# Patient Record
Sex: MALE | URBAN - METROPOLITAN AREA
[De-identification: names, ages, dates, MRNs, and addresses within clinical notes are randomized per-mention and may not be internally consistent; named-entity substitution may affect disease eponyms.]

---

## 2018-10-19 ENCOUNTER — APPOINTMENT (RX ONLY)
Dept: URBAN - METROPOLITAN AREA CLINIC 26 | Facility: CLINIC | Age: 63
Setting detail: DERMATOLOGY
End: 2018-10-19

## 2018-10-19 DIAGNOSIS — L01.01 NON-BULLOUS IMPETIGO: ICD-10-CM

## 2018-10-19 DIAGNOSIS — L30.0 NUMMULAR DERMATITIS: ICD-10-CM

## 2018-10-19 PROBLEM — J30.1 ALLERGIC RHINITIS DUE TO POLLEN: Status: ACTIVE | Noted: 2018-10-19

## 2018-10-19 PROBLEM — L70.0 ACNE VULGARIS: Status: ACTIVE | Noted: 2018-10-19

## 2018-10-19 PROBLEM — G40.909 EPILEPSY, UNSPECIFIED, NOT INTRACTABLE, WITHOUT STATUS EPILEPTICUS: Status: ACTIVE | Noted: 2018-10-19

## 2018-10-19 PROCEDURE — ? COUNSELING

## 2018-10-19 PROCEDURE — ? PRESCRIPTION

## 2018-10-19 PROCEDURE — ? TREATMENT REGIMEN

## 2018-10-19 PROCEDURE — 99213 OFFICE O/P EST LOW 20 MIN: CPT

## 2018-10-19 RX ORDER — MUPIROCIN 20 MG/G
1 OINTMENT TOPICAL BID
Qty: 1 | Refills: 1 | Status: ERX | COMMUNITY
Start: 2018-10-19

## 2018-10-19 RX ORDER — TRIAMCINOLONE ACETONIDE 1 MG/G
1 OINTMENT TOPICAL BID
Qty: 1 | Refills: 0 | Status: ERX | COMMUNITY
Start: 2018-10-19

## 2018-10-19 RX ADMIN — MUPIROCIN 1: 20 OINTMENT TOPICAL at 13:20

## 2018-10-19 RX ADMIN — TRIAMCINOLONE ACETONIDE 1: 1 OINTMENT TOPICAL at 13:23

## 2018-10-19 ASSESSMENT — LOCATION DETAILED DESCRIPTION DERM
LOCATION DETAILED: LEFT POPLITEAL SKIN
LOCATION DETAILED: LEFT DISTAL PRETIBIAL REGION
LOCATION DETAILED: LEFT PROXIMAL CALF
LOCATION DETAILED: RIGHT LATERAL DISTAL PRETIBIAL REGION

## 2018-10-19 ASSESSMENT — LOCATION SIMPLE DESCRIPTION DERM
LOCATION SIMPLE: LEFT CALF
LOCATION SIMPLE: RIGHT PRETIBIAL REGION
LOCATION SIMPLE: LEFT POPLITEAL SKIN
LOCATION SIMPLE: LEFT PRETIBIAL REGION

## 2018-10-19 ASSESSMENT — LOCATION ZONE DERM: LOCATION ZONE: LEG

## 2018-10-19 NOTE — PROCEDURE: TREATMENT REGIMEN
Detail Level: Simple
Initiate Treatment: warm water soaks x 15 minutes bid then apply mupirocin ointment bid
Otc Regimen: hibiclens cleanser qday
Otc Regimen: gsc regimen as reviewed
Plan: rto 2 months or sooner prn
Initiate Treatment: TAC 0.1% ointment then vaseline bid until itch free and smooth, then d/c TAC and continue with GSC regimen
Samples Given: dove cleanser, cerave cleanser, cerave cream, aveeno eczema cream

## 2018-11-30 ENCOUNTER — APPOINTMENT (RX ONLY)
Dept: URBAN - METROPOLITAN AREA CLINIC 23 | Facility: CLINIC | Age: 63
Setting detail: DERMATOLOGY
End: 2018-11-30

## 2018-11-30 DIAGNOSIS — L81.0 POSTINFLAMMATORY HYPERPIGMENTATION: ICD-10-CM

## 2018-11-30 DIAGNOSIS — L30.0 NUMMULAR DERMATITIS: ICD-10-CM | Status: IMPROVED

## 2018-11-30 PROCEDURE — 99213 OFFICE O/P EST LOW 20 MIN: CPT

## 2018-11-30 PROCEDURE — ? TREATMENT REGIMEN

## 2018-11-30 PROCEDURE — ? COUNSELING

## 2018-11-30 ASSESSMENT — LOCATION SIMPLE DESCRIPTION DERM
LOCATION SIMPLE: RIGHT POPLITEAL SKIN
LOCATION SIMPLE: LEFT PRETIBIAL REGION
LOCATION SIMPLE: RIGHT UPPER ARM
LOCATION SIMPLE: RIGHT LOWER LEG
LOCATION SIMPLE: RIGHT BUTTOCK
LOCATION SIMPLE: RIGHT THIGH
LOCATION SIMPLE: LEFT UPPER ARM
LOCATION SIMPLE: LEFT POPLITEAL SKIN

## 2018-11-30 ASSESSMENT — LOCATION ZONE DERM
LOCATION ZONE: ARM
LOCATION ZONE: TRUNK
LOCATION ZONE: LEG

## 2018-11-30 ASSESSMENT — LOCATION DETAILED DESCRIPTION DERM
LOCATION DETAILED: RIGHT POPLITEAL SKIN
LOCATION DETAILED: RIGHT DISTAL POSTERIOR UPPER ARM
LOCATION DETAILED: RIGHT LATERAL POPLITEAL FOSSA
LOCATION DETAILED: RIGHT POSTERIOR LATERAL DISTAL THIGH
LOCATION DETAILED: LEFT POPLITEAL SKIN
LOCATION DETAILED: RIGHT BUTTOCK
LOCATION DETAILED: LEFT MEDIAL PROXIMAL PRETIBIAL REGION
LOCATION DETAILED: LEFT PROXIMAL POSTERIOR UPPER ARM

## 2018-11-30 NOTE — PROCEDURE: TREATMENT REGIMEN
Continue Regimen: Triamcinolone 0.1% ointment BID x 2 weeks for flares.
Otc Regimen: Use eucerin eczema cream or Vaseline to moisturize the body \\nKeep showers short and kim
Detail Level: Zone

## 2019-01-11 ENCOUNTER — APPOINTMENT (RX ONLY)
Dept: URBAN - METROPOLITAN AREA CLINIC 31 | Facility: CLINIC | Age: 64
Setting detail: DERMATOLOGY
End: 2019-01-11

## 2019-01-11 DIAGNOSIS — L30.0 NUMMULAR DERMATITIS: ICD-10-CM | Status: UNCHANGED

## 2019-01-11 DIAGNOSIS — L81.0 POSTINFLAMMATORY HYPERPIGMENTATION: ICD-10-CM | Status: IMPROVED

## 2019-01-11 PROCEDURE — 99213 OFFICE O/P EST LOW 20 MIN: CPT

## 2019-01-11 PROCEDURE — ? COUNSELING

## 2019-01-11 PROCEDURE — ? TREATMENT REGIMEN

## 2019-01-11 ASSESSMENT — LOCATION DETAILED DESCRIPTION DERM
LOCATION DETAILED: LEFT PROXIMAL PRETIBIAL REGION
LOCATION DETAILED: LEFT ANTERIOR PROXIMAL THIGH

## 2019-01-11 ASSESSMENT — LOCATION SIMPLE DESCRIPTION DERM
LOCATION SIMPLE: LEFT PRETIBIAL REGION
LOCATION SIMPLE: LEFT THIGH

## 2019-01-11 ASSESSMENT — LOCATION ZONE DERM: LOCATION ZONE: LEG

## 2019-01-11 NOTE — PROCEDURE: TREATMENT REGIMEN
Otc Regimen: Use eucerin eczema cream or Vaseline to moisturize the body \\nKeep showers short and kim
Detail Level: Zone
Continue Regimen: Triamcinolone 0.1% ointment BID x 2 weeks.
Plan: Discussed pt should be consistent with medication instructions so that we can ensure the rash is responding well to the treatment. If no change at follow up will biopsy

## 2019-02-01 ENCOUNTER — APPOINTMENT (RX ONLY)
Dept: URBAN - METROPOLITAN AREA CLINIC 23 | Facility: CLINIC | Age: 64
Setting detail: DERMATOLOGY
End: 2019-02-01

## 2019-02-01 DIAGNOSIS — L01.01 NON-BULLOUS IMPETIGO: ICD-10-CM | Status: RESOLVED

## 2019-02-01 DIAGNOSIS — L30.0 NUMMULAR DERMATITIS: ICD-10-CM | Status: RESOLVED

## 2019-02-01 PROCEDURE — 99213 OFFICE O/P EST LOW 20 MIN: CPT

## 2019-02-01 PROCEDURE — ? COUNSELING

## 2019-02-01 PROCEDURE — ? TREATMENT REGIMEN

## 2019-02-01 ASSESSMENT — LOCATION DETAILED DESCRIPTION DERM: LOCATION DETAILED: LEFT ANTERIOR PROXIMAL THIGH

## 2019-02-01 ASSESSMENT — LOCATION ZONE DERM: LOCATION ZONE: LEG

## 2019-02-01 ASSESSMENT — LOCATION SIMPLE DESCRIPTION DERM: LOCATION SIMPLE: LEFT THIGH

## 2019-02-01 NOTE — PROCEDURE: TREATMENT REGIMEN
Discontinue Regimen: Triamcinolone ointment
Otc Regimen: Use eucerin eczema cream or Vaseline to moisturize the body \\nKeep showers short and kim
Detail Level: Zone

## 2020-01-21 ENCOUNTER — APPOINTMENT (RX ONLY)
Dept: URBAN - METROPOLITAN AREA CLINIC 23 | Facility: CLINIC | Age: 65
Setting detail: DERMATOLOGY
End: 2020-01-21

## 2020-01-21 DIAGNOSIS — D22 MELANOCYTIC NEVI: ICD-10-CM

## 2020-01-21 DIAGNOSIS — D18.0 HEMANGIOMA: ICD-10-CM

## 2020-01-21 DIAGNOSIS — L82.1 OTHER SEBORRHEIC KERATOSIS: ICD-10-CM

## 2020-01-21 DIAGNOSIS — B35.4 TINEA CORPORIS: ICD-10-CM

## 2020-01-21 PROBLEM — D18.01 HEMANGIOMA OF SKIN AND SUBCUTANEOUS TISSUE: Status: ACTIVE | Noted: 2020-01-21

## 2020-01-21 PROBLEM — D22.5 MELANOCYTIC NEVI OF TRUNK: Status: ACTIVE | Noted: 2020-01-21

## 2020-01-21 PROCEDURE — ? COUNSELING

## 2020-01-21 PROCEDURE — 99213 OFFICE O/P EST LOW 20 MIN: CPT

## 2020-01-21 PROCEDURE — ? PRESCRIPTION

## 2020-01-21 RX ORDER — KETOCONAZOLE 20 MG/G
CREAM TOPICAL
Qty: 1 | Refills: 2 | Status: ERX | COMMUNITY
Start: 2020-01-21

## 2020-01-21 RX ADMIN — KETOCONAZOLE: 20 CREAM TOPICAL at 00:00

## 2020-01-21 ASSESSMENT — LOCATION DETAILED DESCRIPTION DERM
LOCATION DETAILED: PERIUMBILICAL SKIN
LOCATION DETAILED: LEFT INFERIOR MEDIAL UPPER BACK
LOCATION DETAILED: INFERIOR THORACIC SPINE
LOCATION DETAILED: LEFT ANTERIOR DISTAL THIGH

## 2020-01-21 ASSESSMENT — LOCATION ZONE DERM
LOCATION ZONE: TRUNK
LOCATION ZONE: LEG

## 2020-01-21 ASSESSMENT — LOCATION SIMPLE DESCRIPTION DERM
LOCATION SIMPLE: UPPER BACK
LOCATION SIMPLE: LEFT THIGH
LOCATION SIMPLE: LEFT UPPER BACK
LOCATION SIMPLE: ABDOMEN

## 2020-01-21 NOTE — PROCEDURE: MIPS QUALITY
Quality 226: Preventive Care And Screening: Tobacco Use: Screening And Cessation Intervention: Patient screened for tobacco use and is an ex/non-smoker
Detail Level: Detailed
Quality 47: Advance Care Plan: Advance care planning not documented, reason not otherwise specified.
Quality 130: Documentation Of Current Medications In The Medical Record: Current Medications Documented
Quality 431: Preventive Care And Screening: Unhealthy Alcohol Use - Screening: Patient screened for unhealthy alcohol use using a single question and scores less than 2 times per year

## 2020-02-04 ENCOUNTER — APPOINTMENT (RX ONLY)
Dept: URBAN - METROPOLITAN AREA CLINIC 23 | Facility: CLINIC | Age: 65
Setting detail: DERMATOLOGY
End: 2020-02-04

## 2020-02-04 DIAGNOSIS — L30.0 NUMMULAR DERMATITIS: ICD-10-CM

## 2020-02-04 PROCEDURE — ? COUNSELING

## 2020-02-04 PROCEDURE — 99213 OFFICE O/P EST LOW 20 MIN: CPT

## 2020-02-04 PROCEDURE — ? TREATMENT REGIMEN

## 2020-02-04 ASSESSMENT — LOCATION ZONE DERM: LOCATION ZONE: LEG

## 2020-02-04 ASSESSMENT — LOCATION DETAILED DESCRIPTION DERM: LOCATION DETAILED: LEFT ANTERIOR DISTAL THIGH

## 2020-02-04 ASSESSMENT — LOCATION SIMPLE DESCRIPTION DERM: LOCATION SIMPLE: LEFT THIGH

## 2020-02-04 NOTE — PROCEDURE: MIPS QUALITY
Detail Level: Detailed
Quality 130: Documentation Of Current Medications In The Medical Record: Current Medications Documented
Quality 47: Advance Care Plan: Advance care planning not documented, reason not otherwise specified.
Quality 431: Preventive Care And Screening: Unhealthy Alcohol Use - Screening: Patient screened for unhealthy alcohol use using a single question and scores less than 2 times per year
Quality 226: Preventive Care And Screening: Tobacco Use: Screening And Cessation Intervention: Patient screened for tobacco use and is an ex/non-smoker

## 2020-02-04 NOTE — PROCEDURE: TREATMENT REGIMEN
Detail Level: Simple
Plan: Return to office if rash does not clear within 2 weeks.
Discontinue Regimen: Ketoconazole 2% cream
Initiate Treatment: Triamcinolone 0.1% ointment: Apply to affected area of leg twice daily x 2 weeks.

## 2020-12-30 ENCOUNTER — APPOINTMENT (RX ONLY)
Dept: URBAN - METROPOLITAN AREA CLINIC 23 | Facility: CLINIC | Age: 65
Setting detail: DERMATOLOGY
End: 2020-12-30

## 2020-12-30 DIAGNOSIS — L30.0 NUMMULAR DERMATITIS: ICD-10-CM | Status: IMPROVED

## 2020-12-30 PROCEDURE — ? COUNSELING

## 2020-12-30 PROCEDURE — ? TREATMENT REGIMEN

## 2020-12-30 PROCEDURE — ? PRESCRIPTION

## 2020-12-30 PROCEDURE — ? ADDITIONAL NOTES

## 2020-12-30 PROCEDURE — 99213 OFFICE O/P EST LOW 20 MIN: CPT

## 2020-12-30 RX ORDER — TRIAMCINOLONE ACETONIDE 1 MG/G
OINTMENT TOPICAL
Qty: 1 | Refills: 2 | Status: ERX

## 2020-12-30 RX ORDER — MUPIROCIN 20 MG/G
OINTMENT TOPICAL
Qty: 1 | Refills: 2 | Status: ERX

## 2020-12-30 ASSESSMENT — LOCATION SIMPLE DESCRIPTION DERM
LOCATION SIMPLE: LEFT THIGH
LOCATION SIMPLE: RIGHT THIGH
LOCATION SIMPLE: LEFT FOREARM
LOCATION SIMPLE: RIGHT FOREARM

## 2020-12-30 ASSESSMENT — LOCATION ZONE DERM
LOCATION ZONE: LEG
LOCATION ZONE: ARM

## 2020-12-30 ASSESSMENT — LOCATION DETAILED DESCRIPTION DERM
LOCATION DETAILED: LEFT PROXIMAL DORSAL FOREARM
LOCATION DETAILED: LEFT ANTERIOR DISTAL THIGH
LOCATION DETAILED: RIGHT ANTERIOR DISTAL THIGH
LOCATION DETAILED: RIGHT PROXIMAL DORSAL FOREARM

## 2020-12-30 NOTE — PROCEDURE: TREATMENT REGIMEN
Continue Regimen: Triamcinolone acetonide 0.1% topical ointment: Apply to the affected areas of body twice daily x 1-2 weeks until clear. May repeat PRN flares.\\n\\nMupirocin 2% topical ointment: Apply to skin infections 1-2 times daily until healed.
Detail Level: Simple
Otc Regimen: Cerave cream

## 2024-11-08 ENCOUNTER — APPOINTMENT (RX ONLY)
Dept: URBAN - METROPOLITAN AREA CLINIC 23 | Facility: CLINIC | Age: 69
Setting detail: DERMATOLOGY
End: 2024-11-08

## 2024-11-08 DIAGNOSIS — Z41.9 ENCOUNTER FOR PROCEDURE FOR PURPOSES OTHER THAN REMEDYING HEALTH STATE, UNSPECIFIED: ICD-10-CM

## 2024-11-08 PROCEDURE — ? ADDITIONAL NOTES

## 2024-11-08 ASSESSMENT — LOCATION SIMPLE DESCRIPTION DERM
LOCATION SIMPLE: LEFT CHEEK
LOCATION SIMPLE: RIGHT CHEEK

## 2024-11-08 ASSESSMENT — LOCATION ZONE DERM: LOCATION ZONE: FACE

## 2024-11-08 ASSESSMENT — LOCATION DETAILED DESCRIPTION DERM
LOCATION DETAILED: LEFT INFERIOR CENTRAL MALAR CHEEK
LOCATION DETAILED: RIGHT INFERIOR CENTRAL MALAR CHEEK

## 2024-11-08 NOTE — PROCEDURE: ADDITIONAL NOTES
Detail Level: Zone
Render Risk Assessment In Note?: no
Additional Notes: Pt was deferred to a plastic surgeon. Recommend looking into CO2 laser.

## 2024-11-08 NOTE — HPI: SCAR
How Severe Is Your Scar?: mild
Is This A New Presentation, Or A Follow-Up?: Scars
Additional History: Pt states wants to permanently remove acne scarring using a “scaling procedure”, no injections